# Patient Record
Sex: FEMALE | Race: WHITE | NOT HISPANIC OR LATINO | URBAN - NONMETROPOLITAN AREA
[De-identification: names, ages, dates, MRNs, and addresses within clinical notes are randomized per-mention and may not be internally consistent; named-entity substitution may affect disease eponyms.]

---

## 2020-10-13 ENCOUNTER — OFFICE VISIT (OUTPATIENT)
Dept: URBAN - NONMETROPOLITAN AREA CLINIC 13 | Facility: CLINIC | Age: 70
End: 2020-10-13

## 2020-10-13 NOTE — HPI-TODAY'S VISIT:
Ms. Angelic Harris is a 70 year old female referred by Dr. Salazar for abdominal pain, dysphagia, and bowel habit changes.

## 2020-11-03 ENCOUNTER — DASHBOARD ENCOUNTERS (OUTPATIENT)
Age: 70
End: 2020-11-03

## 2020-11-03 ENCOUNTER — OFFICE VISIT (OUTPATIENT)
Dept: URBAN - NONMETROPOLITAN AREA CLINIC 13 | Facility: CLINIC | Age: 70
End: 2020-11-03
Payer: MEDICARE

## 2020-11-03 DIAGNOSIS — Z12.11 COLON CANCER SCREENING: ICD-10-CM

## 2020-11-03 DIAGNOSIS — R19.4 ALTERED BOWEL HABITS: ICD-10-CM

## 2020-11-03 DIAGNOSIS — K21.9 GERD: ICD-10-CM

## 2020-11-03 PROCEDURE — G8484 FLU IMMUNIZE NO ADMIN: HCPCS | Performed by: INTERNAL MEDICINE

## 2020-11-03 PROCEDURE — 99204 OFFICE O/P NEW MOD 45 MIN: CPT | Performed by: INTERNAL MEDICINE

## 2020-11-03 PROCEDURE — 99244 OFF/OP CNSLTJ NEW/EST MOD 40: CPT | Performed by: INTERNAL MEDICINE

## 2020-11-03 NOTE — HPI-TODAY'S VISIT:
Angelic is a new patient referred by Cher Da Silva.  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 Salazar office.  She states that she is always been quite healthy.  She had a few rounds of antibiotics for a foot infection earlier this year.  Since that time she is had altered bowel habits.  Her bowels have always been regular until recently.  She is now having more issues with altered bowel habits, some constipation and some diarrhea.  She has not had any blood in her stool.  She is due for colonoscopy.  She does have lower abdominal bloating and discomfort.  She has not lost any weight.  She denies any significant nausea and vomiting.  She is more of a  and would prefer not to be on medications for her symptoms.

## 2020-11-30 ENCOUNTER — OFFICE VISIT (OUTPATIENT)
Dept: URBAN - NONMETROPOLITAN AREA SURGERY CENTER 1 | Facility: SURGERY CENTER | Age: 70
End: 2020-11-30

## 2020-12-15 ENCOUNTER — OFFICE VISIT (OUTPATIENT)
Dept: URBAN - NONMETROPOLITAN AREA CLINIC 13 | Facility: CLINIC | Age: 70
End: 2020-12-15

## 2021-01-11 ENCOUNTER — OFFICE VISIT (OUTPATIENT)
Dept: URBAN - NONMETROPOLITAN AREA SURGERY CENTER 1 | Facility: SURGERY CENTER | Age: 71
End: 2021-01-11

## 2021-04-02 PROBLEM — 235595009 GASTROESOPHAGEAL REFLUX DISEASE: Status: ACTIVE | Noted: 2020-11-03

## 2021-05-06 ENCOUNTER — CLAIMS CREATED FROM THE CLAIM WINDOW (OUTPATIENT)
Dept: URBAN - METROPOLITAN AREA CLINIC 4 | Facility: CLINIC | Age: 71
End: 2021-05-06
Payer: MEDICARE

## 2021-05-06 ENCOUNTER — OFFICE VISIT (OUTPATIENT)
Dept: URBAN - NONMETROPOLITAN AREA SURGERY CENTER 1 | Facility: SURGERY CENTER | Age: 71
End: 2021-05-06
Payer: MEDICARE

## 2021-05-06 DIAGNOSIS — K31.89 GASTRIC PERFORATION WITHOUT ULCER: ICD-10-CM

## 2021-05-06 DIAGNOSIS — K25.9 ANTRAL ULCER: ICD-10-CM

## 2021-05-06 DIAGNOSIS — K63.89 STENOSIS OF ILEOCECAL VALVE: ICD-10-CM

## 2021-05-06 DIAGNOSIS — R19.4 ALTERED BOWEL HABITS: ICD-10-CM

## 2021-05-06 DIAGNOSIS — K25.7 CHRONIC GASTRIC ULCER WITHOUT HEMORRHAGE OR PERFORATION: ICD-10-CM

## 2021-05-06 PROCEDURE — G8907 PT DOC NO EVENTS ON DISCHARG: HCPCS | Performed by: INTERNAL MEDICINE

## 2021-05-06 PROCEDURE — 88312 SPECIAL STAINS GROUP 1: CPT | Performed by: PATHOLOGY

## 2021-05-06 PROCEDURE — 45380 COLONOSCOPY AND BIOPSY: CPT | Performed by: INTERNAL MEDICINE

## 2021-05-06 PROCEDURE — 88305 TISSUE EXAM BY PATHOLOGIST: CPT | Performed by: PATHOLOGY

## 2021-05-06 PROCEDURE — 43239 EGD BIOPSY SINGLE/MULTIPLE: CPT | Performed by: INTERNAL MEDICINE

## 2023-07-06 ENCOUNTER — TELEPHONE ENCOUNTER (OUTPATIENT)
Dept: URBAN - NONMETROPOLITAN AREA CLINIC 2 | Facility: CLINIC | Age: 73
End: 2023-07-06

## 2024-09-17 ENCOUNTER — OFFICE VISIT (OUTPATIENT)
Dept: URBAN - NONMETROPOLITAN AREA CLINIC 2 | Facility: CLINIC | Age: 74
End: 2024-09-17

## 2024-09-17 ENCOUNTER — LAB OUTSIDE AN ENCOUNTER (OUTPATIENT)
Dept: URBAN - NONMETROPOLITAN AREA CLINIC 2 | Facility: CLINIC | Age: 74
End: 2024-09-17

## 2024-09-17 VITALS
HEART RATE: 90 BPM | HEIGHT: 60 IN | WEIGHT: 169.4 LBS | DIASTOLIC BLOOD PRESSURE: 79 MMHG | BODY MASS INDEX: 33.26 KG/M2 | SYSTOLIC BLOOD PRESSURE: 119 MMHG | TEMPERATURE: 98 F

## 2024-09-17 PROBLEM — 79922009: Status: ACTIVE | Noted: 2024-09-17

## 2024-09-17 PROBLEM — 84089009: Status: ACTIVE | Noted: 2024-09-17

## 2024-09-17 PROBLEM — 442076002: Status: ACTIVE | Noted: 2024-09-17

## 2024-09-17 PROBLEM — 422587007: Status: ACTIVE | Noted: 2024-09-17

## 2024-09-17 PROBLEM — 88111009: Status: ACTIVE | Noted: 2024-09-17

## 2024-09-17 RX ORDER — ZOLPIDEM TARTRATE 10 MG/1
TAKE ONE TABLET BY MOUTH AT BEDTIME AS NEEDED TABLET ORAL
Qty: 90 UNSPECIFIED | Refills: 0 | Status: ACTIVE | COMMUNITY

## 2024-09-17 RX ORDER — VALSARTAN 80 MG/1
TAKE ONE AND ONE-HALF TABLETS BY MOUTH ONE TIME DAILY TABLET ORAL
Qty: 135 UNSPECIFIED | Refills: 0 | Status: ACTIVE | COMMUNITY

## 2024-09-17 RX ORDER — DEXAMETHASONE 1 MG/1
TAKE ONE TABLET BY MOUTH AS A SINGLE DOSE AT 11PM-MIDNIGHT BEFORE SUPRESSION TEST TABLET ORAL
Qty: 1 UNSPECIFIED | Refills: 0 | Status: ACTIVE | COMMUNITY

## 2024-09-17 RX ORDER — METAXALONE 800 MG/1
TAKE ONE TABLET BY MOUTH ONE TIME DAILY AS NEEDED TABLET ORAL
Qty: 5 UNSPECIFIED | Refills: 1 | Status: ACTIVE | COMMUNITY

## 2024-09-17 RX ORDER — ALBUTEROL SULFATE 90 UG/1
INHALE ONE TO TWO PUFFS BY MOUTH EVERY 4 TO 6 HOURS AS NEEDED AEROSOL, METERED RESPIRATORY (INHALATION)
Qty: 8.5 UNSPECIFIED | Refills: 0 | Status: ACTIVE | COMMUNITY

## 2024-09-17 RX ORDER — HYDROCHLOROTHIAZIDE 12.5 MG/1
1 CAPSULE IN THE MORNING CAPSULE, GELATIN COATED ORAL ONCE A DAY
Status: ACTIVE | COMMUNITY

## 2024-09-17 RX ORDER — PANTOPRAZOLE SODIUM 20 MG/1
TAKE ONE TABLET BY MOUTH TWICE A DAY TABLET, DELAYED RELEASE ORAL
Qty: 60 UNSPECIFIED | Refills: 0 | Status: ACTIVE | COMMUNITY

## 2024-09-17 RX ORDER — SUCRALFATE 1 G/1
TAKE 1 TABLET BY MOUTH 30 MINUTES PRIOR TO MEALS AND BEFORE BEDTIME, UP TO 4 TIMES A DAY AS NEEDED TABLET ORAL
Qty: 60 UNSPECIFIED | Refills: 0 | Status: ACTIVE | COMMUNITY

## 2024-09-17 NOTE — HPI-TODAY'S VISIT:
Angelic is a new patient referred by Cher Da Silva.  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 Salazar office.  She states that she is always been quite healthy.  She had a few rounds of antibiotics for a foot infection earlier this year.  Since that time she is had altered bowel habits.  Her bowels have always been regular until recently.  She is now having more issues with altered bowel habits, some constipation and some diarrhea.  She has not had any blood in her stool.  She is due for colonoscopy.  She does have lower abdominal bloating and discomfort.  She has not lost any weight.  She denies any significant nausea and vomiting.  She is more of a  and would prefer not to be on medications for her symptoms. 9/17/2024 20 was presents to clinic for evaluation of worsening gastric discomfort.  She is concerned regarding her hiatal hernia that this may have enlarged and is creating her increased GERD symptoms.  She has been advanced to Dexilant by her PCP for better acid control.

## 2024-10-22 ENCOUNTER — CLAIMS CREATED FROM THE CLAIM WINDOW (OUTPATIENT)
Dept: URBAN - NONMETROPOLITAN AREA SURGERY CENTER 1 | Facility: SURGERY CENTER | Age: 74
End: 2024-10-22
Payer: MEDICARE

## 2024-10-22 ENCOUNTER — CLAIMS CREATED FROM THE CLAIM WINDOW (OUTPATIENT)
Dept: URBAN - METROPOLITAN AREA CLINIC 4 | Facility: CLINIC | Age: 74
End: 2024-10-22
Payer: MEDICARE

## 2024-10-22 DIAGNOSIS — R10.13 ABDOMINAL DISCOMFORT, EPIGASTRIC: ICD-10-CM

## 2024-10-22 DIAGNOSIS — K31.89 REACTIVE GASTROPATHY: ICD-10-CM

## 2024-10-22 DIAGNOSIS — K29.70 GASTRITIS, UNSPECIFIED, WITHOUT BLEEDING: ICD-10-CM

## 2024-10-22 DIAGNOSIS — K31.89 OTHER DISEASES OF STOMACH AND DUODENUM: ICD-10-CM

## 2024-10-22 PROCEDURE — 00731 ANES UPR GI NDSC PX NOS: CPT | Performed by: NURSE ANESTHETIST, CERTIFIED REGISTERED

## 2024-10-22 PROCEDURE — 88312 SPECIAL STAINS GROUP 1: CPT | Performed by: PATHOLOGY

## 2024-10-22 PROCEDURE — 43239 EGD BIOPSY SINGLE/MULTIPLE: CPT | Performed by: CLINIC/CENTER

## 2024-10-22 PROCEDURE — 88305 TISSUE EXAM BY PATHOLOGIST: CPT | Performed by: PATHOLOGY

## 2024-10-22 PROCEDURE — 43239 EGD BIOPSY SINGLE/MULTIPLE: CPT | Performed by: INTERNAL MEDICINE

## 2024-10-22 RX ORDER — HYDROCHLOROTHIAZIDE 12.5 MG/1
1 CAPSULE IN THE MORNING CAPSULE, GELATIN COATED ORAL ONCE A DAY
Status: ACTIVE | COMMUNITY

## 2024-10-22 RX ORDER — PANTOPRAZOLE SODIUM 20 MG/1
TAKE ONE TABLET BY MOUTH TWICE A DAY TABLET, DELAYED RELEASE ORAL
Qty: 60 UNSPECIFIED | Refills: 0 | Status: ACTIVE | COMMUNITY

## 2024-10-22 RX ORDER — ALBUTEROL SULFATE 90 UG/1
INHALE ONE TO TWO PUFFS BY MOUTH EVERY 4 TO 6 HOURS AS NEEDED AEROSOL, METERED RESPIRATORY (INHALATION)
Qty: 8.5 UNSPECIFIED | Refills: 0 | Status: ACTIVE | COMMUNITY

## 2024-10-22 RX ORDER — DEXAMETHASONE 1 MG/1
TAKE ONE TABLET BY MOUTH AS A SINGLE DOSE AT 11PM-MIDNIGHT BEFORE SUPRESSION TEST TABLET ORAL
Qty: 1 UNSPECIFIED | Refills: 0 | Status: ACTIVE | COMMUNITY

## 2024-10-22 RX ORDER — ZOLPIDEM TARTRATE 10 MG/1
TAKE ONE TABLET BY MOUTH AT BEDTIME AS NEEDED TABLET ORAL
Qty: 90 UNSPECIFIED | Refills: 0 | Status: ACTIVE | COMMUNITY

## 2024-10-22 RX ORDER — METAXALONE 800 MG/1
TAKE ONE TABLET BY MOUTH ONE TIME DAILY AS NEEDED TABLET ORAL
Qty: 5 UNSPECIFIED | Refills: 1 | Status: ACTIVE | COMMUNITY

## 2024-10-22 RX ORDER — SUCRALFATE 1 G/1
TAKE 1 TABLET BY MOUTH 30 MINUTES PRIOR TO MEALS AND BEFORE BEDTIME, UP TO 4 TIMES A DAY AS NEEDED TABLET ORAL
Qty: 60 UNSPECIFIED | Refills: 0 | Status: ACTIVE | COMMUNITY

## 2024-10-22 RX ORDER — VALSARTAN 80 MG/1
TAKE ONE AND ONE-HALF TABLETS BY MOUTH ONE TIME DAILY TABLET ORAL
Qty: 135 UNSPECIFIED | Refills: 0 | Status: ACTIVE | COMMUNITY

## 2024-11-13 ENCOUNTER — WEB ENCOUNTER (OUTPATIENT)
Dept: URBAN - NONMETROPOLITAN AREA CLINIC 2 | Facility: CLINIC | Age: 74
End: 2024-11-13

## 2024-11-19 ENCOUNTER — OFFICE VISIT (OUTPATIENT)
Dept: URBAN - NONMETROPOLITAN AREA CLINIC 2 | Facility: CLINIC | Age: 74
End: 2024-11-19